# Patient Record
Sex: FEMALE | Race: BLACK OR AFRICAN AMERICAN | NOT HISPANIC OR LATINO
[De-identification: names, ages, dates, MRNs, and addresses within clinical notes are randomized per-mention and may not be internally consistent; named-entity substitution may affect disease eponyms.]

---

## 2022-10-20 PROBLEM — Z00.00 ENCOUNTER FOR PREVENTIVE HEALTH EXAMINATION: Status: ACTIVE | Noted: 2022-10-20

## 2022-10-21 ENCOUNTER — APPOINTMENT (OUTPATIENT)
Dept: PEDIATRIC ORTHOPEDIC SURGERY | Facility: CLINIC | Age: 27
End: 2022-10-21

## 2022-10-21 VITALS — BODY MASS INDEX: 39.09 KG/M2 | HEIGHT: 64 IN | TEMPERATURE: 97.2 F | WEIGHT: 229 LBS

## 2022-10-21 DIAGNOSIS — M79.644 PAIN IN RIGHT FINGER(S): ICD-10-CM

## 2022-10-21 DIAGNOSIS — Z86.79 PERSONAL HISTORY OF OTHER DISEASES OF THE CIRCULATORY SYSTEM: ICD-10-CM

## 2022-10-21 DIAGNOSIS — M72.2 PLANTAR FASCIAL FIBROMATOSIS: ICD-10-CM

## 2022-10-21 PROCEDURE — 99202 OFFICE O/P NEW SF 15 MIN: CPT

## 2022-10-21 RX ORDER — DICLOFENAC SODIUM 75 MG/1
75 TABLET, DELAYED RELEASE ORAL TWICE DAILY
Qty: 60 | Refills: 1 | Status: ACTIVE | COMMUNITY
Start: 2022-10-21 | End: 1900-01-01

## 2022-10-21 NOTE — PHYSICAL EXAM
[de-identified] : Exam today reveals she has no obvious limp she has good motion to the ankle and subtalar joint however there is restricted dorsiflexion of the ankles on both sides with the knee in full extension.  She can only get to maybe 5 degrees.  Good motion to the subtalar joint is noted on both sides.  She does have tenderness about the plantar fascia on both sides negative tarsal tunnel.  X-rays not felt to be necessary.\par \par With regards to the right hand she has very minimal swelling to the right third finger without deformity.  She does have good motion throughout with no obvious specific points of tenderness no evidence of instability on stress.

## 2022-10-31 NOTE — HISTORY OF PRESENT ILLNESS
Patient reports she is having alcohol withdrawals she is shaking on the inside and is having pain in her teeth.    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  She has had approximately 6 months of pain in her heels and soles on both sides without obvious history of trauma precipitating event.  She has been treated by a podiatrist with innersoles and stretching exercises.  Temporary relief obtained.  She also recently has had 2 weeks of discomfort to her third finger on the right side without any obvious precipitating event.  She has noted very minimal swelling.  She still has good function.  Past history is otherwise unremarkable

## 2022-12-05 ENCOUNTER — APPOINTMENT (OUTPATIENT)
Dept: PEDIATRIC ORTHOPEDIC SURGERY | Facility: CLINIC | Age: 27
End: 2022-12-05

## 2022-12-05 VITALS — TEMPERATURE: 96.8 F | HEIGHT: 64 IN | BODY MASS INDEX: 39.09 KG/M2 | WEIGHT: 229 LBS

## 2022-12-05 DIAGNOSIS — M54.12 RADICULOPATHY, CERVICAL REGION: ICD-10-CM

## 2022-12-05 PROCEDURE — 99213 OFFICE O/P EST LOW 20 MIN: CPT

## 2022-12-05 PROCEDURE — 72040 X-RAY EXAM NECK SPINE 2-3 VW: CPT

## 2022-12-05 RX ORDER — SULINDAC 200 MG/1
200 TABLET ORAL TWICE DAILY
Qty: 60 | Refills: 1 | Status: ACTIVE | COMMUNITY
Start: 2022-12-05 | End: 1900-01-01

## 2022-12-05 NOTE — ASSESSMENT
[FreeTextEntry1] : Impression: Cervical and lumbar radiculitis.\par \par She will be treated with formal physical therapy and will discontinue the diclofenac as she did not find it very helpful.  I have placed her on sulindac with GI precautions she will return as necessary

## 2022-12-05 NOTE — PHYSICAL EXAM
[de-identified] : Exam today reveals she has good motion to the lumbar spine though there is associated spasm and tenderness more so on the left side.  No trigger points.  Straight leg raising is negative.  Her strength is good.  With regards to her neck she has pain and slight restriction of motion in full flexion rotation and tilt to the left.  There is spasm and tenderness in the subaxial region on the left side as well as the trapezius no trigger points present.  She has good motor function to both upper extremities she is neurologically intact.\par \par X-rays of the cervical spine ordered and taken today revealed no significant abnormalities

## 2022-12-05 NOTE — HISTORY OF PRESENT ILLNESS
[de-identified] : This patient returns she is having significant pain and spasm in both the neck and low back region over the past 3 weeks.  Neck is a little bit more symptomatic.  She has had some mild radiation into the left upper extremity no numbness paresthesias motor weakness bladder or bowel dysfunction

## 2022-12-09 RX ORDER — SULINDAC 200 MG/1
200 TABLET ORAL TWICE DAILY
Qty: 60 | Refills: 1 | Status: ACTIVE | COMMUNITY
Start: 2022-12-09 | End: 1900-01-01

## 2023-12-01 ENCOUNTER — APPOINTMENT (OUTPATIENT)
Dept: PEDIATRIC ORTHOPEDIC SURGERY | Facility: CLINIC | Age: 28
End: 2023-12-01

## 2024-05-03 ENCOUNTER — OFFICE (OUTPATIENT)
Dept: URBAN - METROPOLITAN AREA CLINIC 86 | Facility: CLINIC | Age: 29
Setting detail: OPHTHALMOLOGY
End: 2024-05-03
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
## 2024-05-03 PROCEDURE — NO SHOW FE NO SHOW FEE: Performed by: OPHTHALMOLOGY

## 2024-12-11 ENCOUNTER — APPOINTMENT (OUTPATIENT)
Dept: PEDIATRIC ORTHOPEDIC SURGERY | Facility: CLINIC | Age: 29
End: 2024-12-11

## 2024-12-18 ENCOUNTER — NON-APPOINTMENT (OUTPATIENT)
Age: 29
End: 2024-12-18

## 2024-12-18 ENCOUNTER — APPOINTMENT (OUTPATIENT)
Dept: PEDIATRIC ORTHOPEDIC SURGERY | Facility: CLINIC | Age: 29
End: 2024-12-18
Payer: COMMERCIAL

## 2024-12-18 VITALS
TEMPERATURE: 96.7 F | WEIGHT: 220 LBS | SYSTOLIC BLOOD PRESSURE: 110 MMHG | BODY MASS INDEX: 37.56 KG/M2 | HEIGHT: 64 IN | DIASTOLIC BLOOD PRESSURE: 70 MMHG

## 2024-12-18 DIAGNOSIS — M05.761 RHEUMATOID ARTHRITIS WITH RHEUMATOID FACTOR OF RIGHT KNEE W/OUT ORGAN OR SYSTEMS INVOLVEMENT: ICD-10-CM

## 2024-12-18 DIAGNOSIS — M05.762 RHEUMATOID ARTHRITIS WITH RHEUMATOID FACTOR OF RIGHT KNEE W/OUT ORGAN OR SYSTEMS INVOLVEMENT: ICD-10-CM

## 2024-12-18 PROCEDURE — 20610 DRAIN/INJ JOINT/BURSA W/O US: CPT | Mod: 50

## 2024-12-18 PROCEDURE — 99213 OFFICE O/P EST LOW 20 MIN: CPT | Mod: 25
